# Patient Record
Sex: FEMALE | Race: AMERICAN INDIAN OR ALASKA NATIVE
[De-identification: names, ages, dates, MRNs, and addresses within clinical notes are randomized per-mention and may not be internally consistent; named-entity substitution may affect disease eponyms.]

---

## 2019-07-19 ENCOUNTER — HOSPITAL ENCOUNTER (EMERGENCY)
Dept: HOSPITAL 5 - ED | Age: 44
Discharge: HOME | End: 2019-07-19
Payer: MEDICAID

## 2019-07-19 VITALS — SYSTOLIC BLOOD PRESSURE: 153 MMHG | DIASTOLIC BLOOD PRESSURE: 91 MMHG

## 2019-07-19 DIAGNOSIS — Z86.2: ICD-10-CM

## 2019-07-19 DIAGNOSIS — Z98.84: ICD-10-CM

## 2019-07-19 DIAGNOSIS — N39.0: ICD-10-CM

## 2019-07-19 DIAGNOSIS — M54.5: Primary | ICD-10-CM

## 2019-07-19 DIAGNOSIS — Z79.899: ICD-10-CM

## 2019-07-19 LAB
BACTERIA #/AREA URNS HPF: (no result) /HPF
BILIRUB UR QL STRIP: (no result)
BLOOD UR QL VISUAL: (no result)
MUCOUS THREADS #/AREA URNS HPF: (no result) /HPF
PH UR STRIP: 7 [PH] (ref 5–7)
PROT UR STRIP-MCNC: (no result) MG/DL
RBC #/AREA URNS HPF: 6 /HPF (ref 0–6)
UROBILINOGEN UR-MCNC: 2 MG/DL (ref ?–2)
WBC #/AREA URNS HPF: 22 /HPF (ref 0–6)

## 2019-07-19 PROCEDURE — 99283 EMERGENCY DEPT VISIT LOW MDM: CPT

## 2019-07-19 PROCEDURE — 87086 URINE CULTURE/COLONY COUNT: CPT

## 2019-07-19 PROCEDURE — 87186 SC STD MICRODIL/AGAR DIL: CPT

## 2019-07-19 PROCEDURE — 87076 CULTURE ANAEROBE IDENT EACH: CPT

## 2019-07-19 PROCEDURE — 81001 URINALYSIS AUTO W/SCOPE: CPT

## 2019-07-19 PROCEDURE — 96372 THER/PROPH/DIAG INJ SC/IM: CPT

## 2019-07-19 PROCEDURE — 81025 URINE PREGNANCY TEST: CPT

## 2020-12-03 NOTE — EMERGENCY DEPARTMENT REPORT
ED Back Pain/Injury HPI





- General


Chief Complaint: Pain General


Stated Complaint: BACK/SIDES/LEG PAIN/HEAD PAIN


Time Seen by Provider: 07/19/19 17:04


Source: patient


Limitations: No Limitations





- History of Present Illness


Initial Comments: 





Patient is a 43-year-old female who presents the emergency room complaints of 

right lower back pain that began last night.  She states it radiates to her 

right leg.  She also has associated urinary frequency that began a week ago.  

She denies any fall, injury, dysuria, nausea, vomiting, fever, numbness, 

weakness, bowel or bladder incontinence.  She has a past medical history of a 

herniated disc in her lumbar spine per the patient and has seen pain management 

in the past.  Last menstrual cycle was June 13.





- Related Data


                                  Previous Rx's











 Medication  Instructions  Recorded  Last Taken  Type


 


HYDROcodone/ACETAMINOPHEN [Norco 1 each PO Q6HR PRN #20 tablet 03/19/14 Unknown 

Rx





5/325 Tablet]    


 


metroNIDAZOLE [Flagyl] 500 mg PO BID #20 tablet 03/19/14 Unknown Rx


 


Ferrous Sulfate [Feosol] 325 mg PO ONCE #90 tablet 03/23/14 Unknown Rx


 


Cyclobenzaprine [Flexeril] 10 mg PO QHS PRN #10 tablet 07/19/19 Unknown Rx


 


Naproxen [EC-Naproxen] 500 mg PO BID PRN #20 tablet. 07/19/19 Unknown Rx


 


Nitrofurantoin Mono/M-Cryst 100 mg PO BID 5 Days #10 capsule 07/19/19 Unknown Rx





[Macrobid CAP]    


 


predniSONE [Deltasone] 40 mg PO QDAY 7 Days #14 tab 07/19/19 Unknown Rx











                                    Allergies











Allergy/AdvReac Type Severity Reaction Status Date / Time


 


No Known Allergies Allergy   Verified 07/19/19 15:17














ED Review of Systems


ROS: 


Stated complaint: BACK/SIDES/LEG PAIN/HEAD PAIN


Other details as noted in HPI





Comment: All other systems reviewed and negative





ED Past Medical Hx





- Past Medical History


Previous Medical History?: Yes


Additional medical history: buldging/herniated discs (lower back), chronic 

anemia with blood transfusion in the past after gastric bypass.





- Surgical History


Additional Surgical History: gastri bypass- 8/2010





- Social History


Smoking Status: Never Smoker


Substance Use Type: None





- Medications


Home Medications: 


                                Home Medications











 Medication  Instructions  Recorded  Confirmed  Last Taken  Type


 


HYDROcodone/ACETAMINOPHEN [Norco 1 each PO Q6HR PRN #20 tablet 03/19/14  Unknown

 Rx





5/325 Tablet]     


 


metroNIDAZOLE [Flagyl] 500 mg PO BID #20 tablet 03/19/14  Unknown Rx


 


Ferrous Sulfate [Feosol] 325 mg PO ONCE #90 tablet 03/23/14  Unknown Rx


 


Cyclobenzaprine [Flexeril] 10 mg PO QHS PRN #10 tablet 07/19/19  Unknown Rx


 


Naproxen [EC-Naproxen] 500 mg PO BID PRN #20 tablet.dr 07/19/19  Unknown Rx


 


Nitrofurantoin Mono/M-Cryst 100 mg PO BID 5 Days #10 capsule 07/19/19  Unknown 

Rx





[Macrobid CAP]     


 


predniSONE [Deltasone] 40 mg PO QDAY 7 Days #14 tab 07/19/19  Unknown Rx














ED Physical Exam





- General


Limitations: No Limitations


General appearance: alert, in no apparent distress





- Head


Head exam: Present: atraumatic, normocephalic





- Eye


Eye exam: Present: normal appearance, PERRL





- ENT


ENT exam: Present: mucous membranes moist





- Respiratory


Respiratory exam: Present: normal lung sounds bilaterally.  Absent: respiratory 

distress, wheezes, rales, rhonchi, stridor, chest wall tenderness, accessory 

muscle use, decreased breath sounds, prolonged expiratory





- Cardiovascular


Cardiovascular Exam: Present: regular rate, normal rhythm, normal heart sounds. 

 Absent: systolic murmur, diastolic murmur, rubs, gallop





- Back Exam


Back exam: Present: normal inspection, full ROM, paraspinal tenderness (mild 

right lumbar paraspinal muscular discomfort to palpation ), other (discomfort in

 the back with SLR of the right lower extremity, mild TTP of the right gluteus 

ida).  Absent: CVA tenderness (R), CVA tenderness (L), vertebral tenderness





- Neurological Exam


Neurological exam: Present: alert, oriented X3, CN II-XII intact, normal gait, 

other (5/5 strength in the BLE, sensation intact in the BLE, equal 2+ distal 

pulses in the BLE).  Absent: motor sensory deficit





- Psychiatric


Psychiatric exam: Present: normal affect, normal mood





- Skin


Skin exam: Present: warm, dry, intact





ED Course


                                   Vital Signs











  07/19/19





  15:58


 


Pulse Rate 83


 


Respiratory 20





Rate 


 


Blood Pressure 153/91





[Left] 


 


O2 Sat by Pulse 98





Oximetry 














ED Medical Decision Making





- Lab Data








                                   Lab Results











  07/19/19 Range/Units





  17:30 


 


Urine Color  Yellow  (Yellow)  


 


Urine Turbidity  Clear  (Clear)  


 


Urine pH  7.0  (5.0-7.0)  


 


Ur Specific Gravity  1.015  (1.003-1.030)  


 


Urine Protein  <15 mg/dl  (Negative)  mg/dL


 


Urine Glucose (UA)  Neg  (Negative)  mg/dL


 


Urine Ketones  Neg  (Negative)  mg/dL


 


Urine Blood  Sm  (Negative)  


 


Urine Nitrite  Pos  (Negative)  


 


Ur Reducing Substances  Not Reportable  


 


Urine Bilirubin  Neg  (Negative)  


 


Urine Ictotest  Not Reportable  


 


Urine Urobilinogen  2.0  (<2.0)  mg/dL


 


Ur Leukocyte Esterase  Tr  (Negative)  


 


Urine WBC (Auto)  22.0 H  (0.0-6.0)  /HPF


 


Urine RBC (Auto)  6.0  (0.0-6.0)  /HPF


 


Urine Bacteria (Auto)  1+  (Negative)  /HPF


 


Urine Mucus  Few  /HPF


 


Urine HCG, Qual  Negative  (Negative)  














- Medical Decision Making





Patient is a 43-year-old female who presents the emergency room complaints of 

right lower back pain that began last night.  She states it radiates to her 

right leg.  She also has associated urinary frequency that began a week ago.  

She denies any fall, injury, dysuria, nausea, vomiting, fever, numbness, 

weakness, bowel or bladder incontinence.  She has a past medical history of a 

herniated disc in her lumbar spine per the patient and has seen pain management 

in the past.  Last menstrual cycle was June 13. VSS. on exam: mild right lumbar 

paraspinal muscular discomfort to palpation, discomfort in the back with SLR of 

the right lower extremity, mild TTP of the right gluteus ida, 

neurovascularly intact, no midline spinal tenderness, no step offs, no 

deformities. UA shows evidence of UTI. pts back discomfort treated while in the 

ED. pt given abx for UTI. given prescription for prednisone, naproxen, and 

flexeril for sciatica vs. her hx of herniated disc. discussed to drink plenty of

 water. advised to please take medication as prescribed.  Do not drive or 

operate heavy machinery while taking muscle relaxer.  May use ice, heat, rest, 

epsom salt bath.  follow up with a primary care doctor in the next 2-3 days.  

Return to the emergency room for any new or worsening symptoms.





- Differential Diagnosis


UTI, strain, DDD, DJD, sciatica, herniated disc, spondylolysis


Critical care attestation.: 


If time is entered above; I have spent that time in minutes in the direct care 

of this critically ill patient, excluding procedure time.








ED Disposition


Clinical Impression: 


Low back pain


Qualifiers:


 Chronicity: acute Back pain laterality: right Sciatica presence: with sciatica 

Sciatica laterality: sciatica of right side Qualified Code(s): M54.41 - Lumbago 

with sciatica, right side





UTI (urinary tract infection)


Qualifiers:


 Urinary tract infection type: acute cystitis Hematuria presence: without 

hematuria Qualified Code(s): N30.00 - Acute cystitis without hematuria





Disposition: DC-01 TO HOME OR SELFCARE


Is pt being admited?: No


Does the pt Need Aspirin: No


Condition: Stable


Instructions:  Urinary Tract Infection in Women (ED), Sciatica (ED)


Additional Instructions: 


Please take medication as prescribed.  Do not drive or operate heavy machinery 

while taking muscle relaxer.  May use ice, heat, rest, epsom salt bath.  follow 

up with a primary care doctor in the next 2-3 days.  Return to the emergency 

room for any new or worsening symptoms.


Prescriptions: 


Cyclobenzaprine [Flexeril] 10 mg PO QHS PRN #10 tablet


 PRN Reason: Muscle Spasm


predniSONE [Deltasone] 40 mg PO QDAY 7 Days #14 tab


Naproxen [EC-Naproxen] 500 mg PO BID PRN #20 tablet.


 PRN Reason: Pain, Moderate (4-6)


Nitrofurantoin Mono/M-Cryst [Macrobid CAP] 100 mg PO BID 5 Days #10 capsule


Referrals: 


[facility] [place] [Primary Care Provider] - 2-3 Days


[facility] [Outside] - 2-3 Days


[facility] [Outside] - 2-3 Days


Time of Disposition: 18:21


Print Language: ENGLISH Received report from [facility]. Assume care of patient at this time.

## 2022-08-15 ENCOUNTER — DASHBOARD ENCOUNTERS (OUTPATIENT)
Age: 47
End: 2022-08-15

## 2022-08-15 ENCOUNTER — OFFICE VISIT (OUTPATIENT)
Dept: URBAN - METROPOLITAN AREA CLINIC 17 | Facility: CLINIC | Age: 47
End: 2022-08-15
Payer: COMMERCIAL

## 2022-08-15 ENCOUNTER — WEB ENCOUNTER (OUTPATIENT)
Dept: URBAN - METROPOLITAN AREA CLINIC 17 | Facility: CLINIC | Age: 47
End: 2022-08-15

## 2022-08-15 VITALS
HEART RATE: 66 BPM | WEIGHT: 195 LBS | SYSTOLIC BLOOD PRESSURE: 112 MMHG | HEIGHT: 65 IN | TEMPERATURE: 97.6 F | BODY MASS INDEX: 32.49 KG/M2 | DIASTOLIC BLOOD PRESSURE: 76 MMHG

## 2022-08-15 DIAGNOSIS — Z98.84 GASTRIC BYPASS STATUS FOR OBESITY: ICD-10-CM

## 2022-08-15 DIAGNOSIS — E65 CENTRAL OBESITY: ICD-10-CM

## 2022-08-15 DIAGNOSIS — Z12.11 SCREEN FOR COLON CANCER: ICD-10-CM

## 2022-08-15 DIAGNOSIS — D50.0 IRON DEFICIENCY ANEMIA DUE TO CHRONIC BLOOD LOSS: ICD-10-CM

## 2022-08-15 DIAGNOSIS — E55.9 VITAMIN D DEFICIENCY DISEASE: ICD-10-CM

## 2022-08-15 PROBLEM — 34713006: Status: ACTIVE | Noted: 2022-08-15

## 2022-08-15 PROBLEM — 724556004: Status: ACTIVE | Noted: 2022-08-15

## 2022-08-15 PROCEDURE — 99244 OFF/OP CNSLTJ NEW/EST MOD 40: CPT | Performed by: INTERNAL MEDICINE

## 2022-08-15 PROCEDURE — 99204 OFFICE O/P NEW MOD 45 MIN: CPT | Performed by: INTERNAL MEDICINE

## 2022-08-15 NOTE — HPI-TODAY'S VISIT:
The patient with history of central obesity who presents for a screening colonoscopy. The pt denies melena, hematemesis or hematochezia. The pt notes that she has poor eating habits and she notes that she will snore intemittently as well. She denies constipation and signficant GERD.  She denies  tobacco use and will have occasional alcohol usage.   The patient's consultation note will be sent to the referring MD, Dr. Myra Schilling.

## 2022-08-16 PROBLEM — 248311001: Status: ACTIVE | Noted: 2022-08-15

## 2022-08-25 ENCOUNTER — OFFICE VISIT (OUTPATIENT)
Dept: URBAN - METROPOLITAN AREA CLINIC 16 | Facility: CLINIC | Age: 47
End: 2022-08-25
Payer: COMMERCIAL

## 2022-08-25 DIAGNOSIS — K76.0 FATTY (CHANGE OF) LIVER: ICD-10-CM

## 2022-08-25 DIAGNOSIS — R10.84 ABDOMINAL CRAMPING, GENERALIZED: ICD-10-CM

## 2022-08-25 PROCEDURE — 76700 US EXAM ABDOM COMPLETE: CPT | Performed by: INTERNAL MEDICINE

## 2022-11-15 ENCOUNTER — OFFICE VISIT (OUTPATIENT)
Dept: URBAN - METROPOLITAN AREA SURGERY CENTER 16 | Facility: SURGERY CENTER | Age: 47
End: 2022-11-15

## 2022-11-15 ENCOUNTER — CLAIMS CREATED FROM THE CLAIM WINDOW (OUTPATIENT)
Dept: URBAN - METROPOLITAN AREA SURGERY CENTER 16 | Facility: SURGERY CENTER | Age: 47
End: 2022-11-15
Payer: COMMERCIAL

## 2022-11-15 DIAGNOSIS — D12.5 ADENOMA OF SIGMOID COLON: ICD-10-CM

## 2022-11-15 DIAGNOSIS — Z12.11 COLON CANCER SCREENING: ICD-10-CM

## 2022-11-15 PROCEDURE — G8907 PT DOC NO EVENTS ON DISCHARG: HCPCS | Performed by: INTERNAL MEDICINE

## 2022-11-15 PROCEDURE — 45380 COLONOSCOPY AND BIOPSY: CPT | Performed by: INTERNAL MEDICINE
